# Patient Record
Sex: FEMALE | Race: WHITE | Employment: FULL TIME | URBAN - NONMETROPOLITAN AREA
[De-identification: names, ages, dates, MRNs, and addresses within clinical notes are randomized per-mention and may not be internally consistent; named-entity substitution may affect disease eponyms.]

---

## 2021-09-21 ENCOUNTER — HOSPITAL ENCOUNTER (OUTPATIENT)
Age: 51
Setting detail: SPECIMEN
Discharge: HOME | End: 2021-09-21
Payer: COMMERCIAL

## 2021-09-21 PROCEDURE — 86762 RUBELLA ANTIBODY: CPT

## 2021-09-21 PROCEDURE — 86765 RUBEOLA ANTIBODY: CPT

## 2021-09-21 PROCEDURE — 86735 MUMPS ANTIBODY: CPT

## 2021-09-21 PROCEDURE — 86787 VARICELLA-ZOSTER ANTIBODY: CPT

## 2021-09-21 PROCEDURE — 36415 COLL VENOUS BLD VENIPUNCTURE: CPT

## 2021-09-22 LAB — RUBV IGG SER QL: 31 IU/ML

## 2021-09-24 LAB
MEASLES ANTIBODY IGG: 3.16
MUV IGG SER QL: 2.52
VZV IGG SER QL IA: 4.07

## 2022-06-13 ENCOUNTER — OFFICE VISIT (OUTPATIENT)
Dept: PRIMARY CARE CLINIC | Age: 52
End: 2022-06-13
Payer: COMMERCIAL

## 2022-06-13 VITALS
DIASTOLIC BLOOD PRESSURE: 78 MMHG | SYSTOLIC BLOOD PRESSURE: 126 MMHG | HEIGHT: 66 IN | HEART RATE: 60 BPM | OXYGEN SATURATION: 98 % | BODY MASS INDEX: 38.25 KG/M2 | WEIGHT: 238 LBS

## 2022-06-13 DIAGNOSIS — Z13.1 DIABETES MELLITUS SCREENING: ICD-10-CM

## 2022-06-13 DIAGNOSIS — Z13.220 LIPID SCREENING: ICD-10-CM

## 2022-06-13 DIAGNOSIS — Z12.11 ENCOUNTER FOR SCREENING FOR MALIGNANT NEOPLASM OF COLON: ICD-10-CM

## 2022-06-13 DIAGNOSIS — E03.9 HYPOTHYROIDISM, UNSPECIFIED TYPE: ICD-10-CM

## 2022-06-13 DIAGNOSIS — I10 PRIMARY HYPERTENSION: ICD-10-CM

## 2022-06-13 DIAGNOSIS — Z00.00 ENCOUNTER FOR WELL ADULT EXAM WITHOUT ABNORMAL FINDINGS: Primary | ICD-10-CM

## 2022-06-13 DIAGNOSIS — Z12.31 ENCOUNTER FOR SCREENING MAMMOGRAM FOR MALIGNANT NEOPLASM OF BREAST: ICD-10-CM

## 2022-06-13 PROCEDURE — 99386 PREV VISIT NEW AGE 40-64: CPT | Performed by: STUDENT IN AN ORGANIZED HEALTH CARE EDUCATION/TRAINING PROGRAM

## 2022-06-13 RX ORDER — OLMESARTAN MEDOXOMIL AND HYDROCHLOROTHIAZIDE 40/25 40; 25 MG/1; MG/1
1 TABLET ORAL DAILY
COMMUNITY
End: 2022-06-13 | Stop reason: SDUPTHER

## 2022-06-13 RX ORDER — HYDROCHLOROTHIAZIDE 25 MG/1
25 TABLET ORAL DAILY
COMMUNITY
End: 2022-06-13 | Stop reason: SDUPTHER

## 2022-06-13 RX ORDER — HYDROCHLOROTHIAZIDE 25 MG/1
25 TABLET ORAL DAILY
Qty: 90 TABLET | Refills: 1 | Status: SHIPPED | OUTPATIENT
Start: 2022-06-13 | End: 2022-06-15 | Stop reason: CLARIF

## 2022-06-13 RX ORDER — OLMESARTAN MEDOXOMIL AND HYDROCHLOROTHIAZIDE 40/25 40; 25 MG/1; MG/1
1 TABLET ORAL DAILY
Qty: 90 TABLET | Refills: 1 | Status: SHIPPED | OUTPATIENT
Start: 2022-06-13 | End: 2022-09-09 | Stop reason: SDUPTHER

## 2022-06-13 RX ORDER — LEVOTHYROXINE SODIUM 0.12 MG/1
125 TABLET ORAL DAILY
Qty: 90 TABLET | Refills: 1 | Status: SHIPPED | OUTPATIENT
Start: 2022-06-13 | End: 2022-09-09 | Stop reason: SDUPTHER

## 2022-06-13 RX ORDER — LEVOTHYROXINE SODIUM 0.12 MG/1
125 TABLET ORAL DAILY
COMMUNITY
End: 2022-06-13 | Stop reason: SDUPTHER

## 2022-06-13 ASSESSMENT — PATIENT HEALTH QUESTIONNAIRE - PHQ9
DEPRESSION UNABLE TO ASSESS: PT REFUSES
SUM OF ALL RESPONSES TO PHQ QUESTIONS 1-9: 0
SUM OF ALL RESPONSES TO PHQ9 QUESTIONS 1 & 2: 0
SUM OF ALL RESPONSES TO PHQ QUESTIONS 1-9: 0
SUM OF ALL RESPONSES TO PHQ QUESTIONS 1-9: 0
2. FEELING DOWN, DEPRESSED OR HOPELESS: 0
1. LITTLE INTEREST OR PLEASURE IN DOING THINGS: 0
SUM OF ALL RESPONSES TO PHQ QUESTIONS 1-9: 0

## 2022-06-13 NOTE — PROGRESS NOTES
received counseling on the following healthy behaviors: nutrition, exercise and medication adherence. I encouraged and discussed lifestyle modifications including diet and exercise and the patient was agreeable to making positive/beneficial changes to both to help improve their overall health. 150+ minutes/week of exercise would be beneficial for the patient. Medications Discontinued During This Encounter   Medication Reason    hydroCHLOROthiazide (HYDRODIURIL) 25 MG tablet REORDER    levothyroxine (SYNTHROID) 125 MCG tablet REORDER    olmesartan-hydroCHLOROthiazide (BENICAR HCT) 40-25 MG per tablet REORDER      Return in about 6 months (around 12/13/2022), or if symptoms worsen or fail to improve, for Book PAP anytime when patient is available, F/U Meds/Thyroid in 6 months. Discussed use, benefit, and side effects of prescribed medications. Barriers to medication compliance addressed. Patient given educational materials: see patient instructions. HM - HM items completed today as per orders. Outstanding HM items though not limited to immunizations were discussed with the patient today, including risks, benefits and alternatives. The patient will discuss these during the next appointment per their preference. If there are any worsening or concerning signs or symptoms, patient will report to the ED and/or contact [id number] for immediate evaluation. Teach back method was used. All patient questions answered. Pt voiced understanding. Subjective    SUBJECTIVE/OBJECTIVE:    HPI     Establish Care, Hypertension, and Hypothyroidism  Patient states that she is here for the a Well Adult Exam / Establish care  Patient works in registration at [employer]    Hypertension: Patient here for follow-up of elevated blood pressure. She is not exercising. She is not adherent to a low-salt diet. Blood pressure is not being monitored at homeCardiac symptoms fatigue.  Patient denies chest pain, chest pressure/discomfort, claudication, dyspnea, exertional chest pressure/discomfort, irregular heart beat, lower extremity edema, near-syncope, orthopnea, palpitations, paroxysmal nocturnal dyspnea, syncope and tachypnea. Cardiovascular risk factors: hypertension, obesity (BMI >= 30 kg/m2) and sedentary lifestyle. Use of agents associated with hypertension: none. History of target organ damage: none. The patient is currently on HCTZ/Benicar-HCTZ. Hypothyroidism (diagnosed about 4 years ago). Patient complains of hypothyroidism. Current symptoms: fatigue at times. Patient denies change in energy level, diarrhea, heat / cold intolerance, nervousness, palpitations and weight changes Symptoms are stabilized. She was diagnosed about 4 years ago because she was very tired. Last TSH was on 2021;  \"  Ref Range & Units 21 1207    TSH 0.45 - 5.33 uIU/ML 0.594    Resulting 400 [facility] 635   Specimen Collected: 21 12:07 Last Resulted: 21 14:08   Received From: Tavon OSWALD Morris  Result Received: 22 15:34   \"    Patient states that she has a history of a brain tumor in  which she states was benign. She had trouble walking at that time but denies any acute neurological deficits since then. Last PAP -2017 - was about 5+ years ago - WNL, due for a repeat; HPV was negative, [id number]  Last Mammogram was  - WNL, due for repeat  Last Colonoscopy was about 5 years ago - WNL; patient would want to get a Cologuard instead. She states that she received a colonoscopy as she does not know her family history in detail as she has been adopted.     Family History   Adopted: Yes   Problem Relation Age of Onset    Thyroid Disease Mother     Hypertension Mother     Other Father         shrinking brain syndrome       Health Maintenance   Alcohol/Substance use History - None/Minimal  Smoking History    Tobacco Use Smoking status: Never Smoker      Smokeless tobacco: Never Used      Health Maintenance   Topic Date Due    Cervical cancer screen  Never done    Colorectal Cancer Screen  Never done    Breast cancer screen  Never done    Shingles vaccine (1 of 2) Never done    DTaP/Tdap/Td vaccine (1 - Tdap) 06/13/2023 (Originally 5/7/1990)    Hepatitis C screen  06/13/2023 (Originally 5/7/1989)    HIV screen  06/13/2023 (Originally 5/7/1986)    COVID-19 Vaccine (1) 06/13/2032 (Originally 5/7/1976)    Depression Screen  06/13/2023    A1C test (Diabetic or Prediabetic)  06/14/2023    Lipids  06/14/2027    Flu vaccine  Completed    Hepatitis A vaccine  Aged Out    Hepatitis B vaccine  Aged Out    Hib vaccine  Aged Out    Meningococcal (ACWY) vaccine  Aged Out    Pneumococcal 0-64 years Vaccine  Aged Out      Depression Screening  PHQ Scores 6/13/2022   PHQ2 Score 0   PHQ9 Score 0     Interpretation of Total Score Depression Severity: 1-4 = Minimal depression, 5-9 = Mild depression, 10-14 = Moderate depression, 15-19 = Moderately severe depression, 20-27 = Severe depression      Review of Systems   Constitutional: Negative for activity change, appetite change, chills, diaphoresis,, fever and unexpected weight change. Positive for fatigue  HENT: Negative for sinus pressure, sinus pain, sore throat and trouble swallowing. Respiratory: Negative for cough, shortness of breath and wheezing. Cardiovascular: Negative for chest pain, palpitations and leg swelling. Gastrointestinal: Negative for abdominal pain, diarrhea, nausea and vomiting. Endocrine: Negative for cold intolerance, polydipsia, polyphagia and polyuria. Genitourinary: Negative for difficulty urinating, flank pain and frequency. Musculoskeletal: Negative for gait problem and joint swelling. Negative for back pain, neck pain and neck stiffness. Skin: Negative for color change and wound. Negative for pallor and rash.    Allergic/Immunologic: Negative for environmental allergies and food allergies. Neurological: Negative for light-headedness, numbness and headaches. Psychiatric/Behavioral: Negative for sleep disturbance. Negative for confusion and suicidal ideas. Objective    Physical Exam   Constitutional: Patient is oriented to person, place, and time. Patient appears well-developed and well-nourished. No distress. HENT: Head: Normocephalic and atraumatic. Eyes: Pupils are equal, round, and reactive to light. Conjunctivae are normal. Right eye exhibits no discharge. Left eye exhibits no discharge. Cardiovascular: Normal rate, regular rhythm and normal heart sounds. Pulmonary/Chest: Effort normal and breath sounds normal. No respiratory distress. Patient has no wheezes. Abdominal: Soft. Bowel sounds are normal. Patient exhibits no distension. There is no tenderness. Musculoskeletal:  Patient exhibits no edema and tenderness. Patient exhibits no deformity. Neurological: Patient is alert and oriented to person, place, and time. Neurological Exam: alert, oriented, normal speech, no focal findings or movement disorder noted, screening mental status exam normal, neck supple without rigidity, cranial nerves II through XII intact, funduscopic exam normal, discs flat and sharp, DTR's normal and symmetric, Babinski sign negative, motor and sensory grossly normal bilaterally, normal muscle tone, no tremors, strength 5/5, Romberg sign negative, normal gait and station. Skin: Skin is warm and dry. Patient is not diaphoretic. Posterior cervical neck appears to have well-healed surgical scar present. Nontender to palpation. Surgical scar appears clean dry and intact  Psychiatric: Patient's speech is normal and behavior is normal. Thought content normal.   Vitals reviewed.     /78   Pulse 60   Ht 5' 6\" (1.676 m)   Wt 238 lb (108 kg)   SpO2 98%   BMI 38.41 kg/m²   BP Readings from Last 3 Encounters:   06/13/22 126/78     Lab Results Component Value Date    WBC 5.8 06/14/2022    HGB 13.1 06/14/2022    HCT 40.3 06/14/2022     06/14/2022    CHOL 152 06/14/2022    TRIG 216 (H) 06/14/2022    HDL 34 (L) 06/14/2022    ALT 44 (H) 06/14/2022    AST 36 (H) 06/14/2022     06/14/2022    K 3.6 (L) 06/14/2022     06/14/2022    CREATININE 0.85 06/14/2022    BUN 21 (H) 06/14/2022    CO2 28 06/14/2022    TSH 0.91 06/14/2022    LABA1C 6.2 (H) 06/14/2022     Lab Results   Component Value Date    CALCIUM 9.8 06/14/2022     Lab Results   Component Value Date    LDLCHOLESTEROL 75 06/14/2022       CURRENT MEDS W/ ASSOC DIAG     No medications on file. Please note that this chart was generated using voice recognition Dragon dictation software. Although every effort was made to ensure the accuracy of this automated transcription, some errors in transcription may have occurred.     Electronically signed by Jaz Pena MD on 6/15/2022 at 8:38 AM

## 2022-06-14 ENCOUNTER — HOSPITAL ENCOUNTER (OUTPATIENT)
Age: 52
Discharge: HOME | End: 2022-06-14
Payer: COMMERCIAL

## 2022-06-14 DIAGNOSIS — Z13.220 LIPID SCREENING: ICD-10-CM

## 2022-06-14 DIAGNOSIS — Z13.1 DIABETES MELLITUS SCREENING: ICD-10-CM

## 2022-06-14 DIAGNOSIS — E03.9 HYPOTHYROIDISM, UNSPECIFIED TYPE: ICD-10-CM

## 2022-06-14 DIAGNOSIS — I10 PRIMARY HYPERTENSION: ICD-10-CM

## 2022-06-14 LAB
ABSOLUTE EOS #: 0.26 K/UL (ref 0–0.44)
ABSOLUTE IMMATURE GRANULOCYTE: <0.03 K/UL (ref 0–0.3)
ABSOLUTE LYMPH #: 1.93 K/UL (ref 1.1–3.7)
ABSOLUTE MONO #: 0.56 K/UL (ref 0.1–1.2)
ALBUMIN SERPL-MCNC: 4.4 G/DL (ref 3.5–5.2)
ALBUMIN/GLOBULIN RATIO: 1.5 (ref 1–2.5)
ALP BLD-CCNC: 83 U/L (ref 35–104)
ALT SERPL-CCNC: 44 U/L (ref 5–33)
ANION GAP SERPL CALCULATED.3IONS-SCNC: 9 MMOL/L (ref 9–17)
AST SERPL-CCNC: 36 U/L
BASOPHILS # BLD: 1 % (ref 0–2)
BASOPHILS ABSOLUTE: 0.05 K/UL (ref 0–0.2)
BILIRUB SERPL-MCNC: 0.31 MG/DL (ref 0.3–1.2)
BILIRUBIN DIRECT: <0.08 MG/DL
BILIRUBIN, INDIRECT: ABNORMAL MG/DL (ref 0–1)
BUN BLDV-MCNC: 21 MG/DL (ref 6–20)
CALCIUM SERPL-MCNC: 9.8 MG/DL (ref 8.6–10.4)
CHLORIDE BLD-SCNC: 100 MMOL/L (ref 98–107)
CHOLESTEROL/HDL RATIO: 4.5
CHOLESTEROL: 152 MG/DL
CO2: 28 MMOL/L (ref 20–31)
CREAT SERPL-MCNC: 0.85 MG/DL (ref 0.5–0.9)
EOSINOPHILS RELATIVE PERCENT: 5 % (ref 1–4)
ESTIMATED AVERAGE GLUCOSE: 131 MG/DL
GFR AFRICAN AMERICAN: >60 ML/MIN
GFR NON-AFRICAN AMERICAN: >60 ML/MIN
GFR SERPL CREATININE-BSD FRML MDRD: ABNORMAL ML/MIN/{1.73_M2}
GFR SERPL CREATININE-BSD FRML MDRD: ABNORMAL ML/MIN/{1.73_M2}
GLUCOSE BLD-MCNC: 106 MG/DL (ref 70–99)
HBA1C MFR BLD: 6.2 % (ref 4–6)
HCT VFR BLD CALC: 40.3 % (ref 36.3–47.1)
HDLC SERPL-MCNC: 34 MG/DL
HEMOGLOBIN: 13.1 G/DL (ref 11.9–15.1)
IMMATURE GRANULOCYTES: 0 %
LDL CHOLESTEROL: 75 MG/DL (ref 0–130)
LYMPHOCYTES # BLD: 34 % (ref 24–43)
MCH RBC QN AUTO: 31.5 PG (ref 25.2–33.5)
MCHC RBC AUTO-ENTMCNC: 32.5 G/DL (ref 28.4–34.8)
MCV RBC AUTO: 96.9 FL (ref 82.6–102.9)
MONOCYTES # BLD: 10 % (ref 3–12)
NRBC AUTOMATED: 0 PER 100 WBC
PDW BLD-RTO: 12.3 % (ref 11.8–14.4)
PLATELET # BLD: 215 K/UL (ref 138–453)
PMV BLD AUTO: 9.7 FL (ref 8.1–13.5)
POTASSIUM SERPL-SCNC: 3.6 MMOL/L (ref 3.7–5.3)
RBC # BLD: 4.16 M/UL (ref 3.95–5.11)
SEG NEUTROPHILS: 50 % (ref 36–65)
SEGMENTED NEUTROPHILS ABSOLUTE COUNT: 2.95 K/UL (ref 1.5–8.1)
SODIUM BLD-SCNC: 137 MMOL/L (ref 135–144)
TOTAL PROTEIN: 7.4 G/DL (ref 6.4–8.3)
TRIGL SERPL-MCNC: 216 MG/DL
TSH SERPL DL<=0.05 MIU/L-ACNC: 0.91 UIU/ML (ref 0.3–5)
WBC # BLD: 5.8 K/UL (ref 3.5–11.3)

## 2022-06-14 PROCEDURE — 84443 ASSAY THYROID STIM HORMONE: CPT

## 2022-06-14 PROCEDURE — 36415 COLL VENOUS BLD VENIPUNCTURE: CPT

## 2022-06-14 PROCEDURE — 85025 COMPLETE CBC W/AUTO DIFF WBC: CPT

## 2022-06-14 PROCEDURE — 80061 LIPID PANEL: CPT

## 2022-06-14 PROCEDURE — 80053 COMPREHEN METABOLIC PANEL: CPT

## 2022-06-14 PROCEDURE — 82248 BILIRUBIN DIRECT: CPT

## 2022-06-14 PROCEDURE — 83036 HEMOGLOBIN GLYCOSYLATED A1C: CPT

## 2022-07-13 PROBLEM — Z00.00 ENCOUNTER FOR WELL ADULT EXAM WITHOUT ABNORMAL FINDINGS: Status: RESOLVED | Noted: 2022-06-13 | Resolved: 2022-07-13

## 2022-07-13 PROBLEM — Z12.31 ENCOUNTER FOR SCREENING MAMMOGRAM FOR MALIGNANT NEOPLASM OF BREAST: Status: RESOLVED | Noted: 2022-06-13 | Resolved: 2022-07-13

## 2022-07-13 PROBLEM — Z13.220 LIPID SCREENING: Status: RESOLVED | Noted: 2022-06-13 | Resolved: 2022-07-13

## 2022-07-13 PROBLEM — Z12.11 ENCOUNTER FOR SCREENING FOR MALIGNANT NEOPLASM OF COLON: Status: RESOLVED | Noted: 2022-06-13 | Resolved: 2022-07-13

## 2022-07-13 PROBLEM — Z13.1 DIABETES MELLITUS SCREENING: Status: RESOLVED | Noted: 2022-06-13 | Resolved: 2022-07-13

## 2022-07-25 ENCOUNTER — OFFICE VISIT (OUTPATIENT)
Dept: PRIMARY CARE CLINIC | Age: 52
End: 2022-07-25
Payer: COMMERCIAL

## 2022-07-25 ENCOUNTER — HOSPITAL ENCOUNTER (OUTPATIENT)
Age: 52
Setting detail: SPECIMEN
Discharge: HOME | End: 2022-07-25
Payer: COMMERCIAL

## 2022-07-25 VITALS
RESPIRATION RATE: 18 BRPM | BODY MASS INDEX: 37.64 KG/M2 | SYSTOLIC BLOOD PRESSURE: 129 MMHG | WEIGHT: 234.2 LBS | HEART RATE: 67 BPM | HEIGHT: 66 IN | DIASTOLIC BLOOD PRESSURE: 85 MMHG

## 2022-07-25 DIAGNOSIS — J22 ACUTE RESPIRATORY INFECTION: ICD-10-CM

## 2022-07-25 DIAGNOSIS — J22 ACUTE RESPIRATORY INFECTION: Primary | ICD-10-CM

## 2022-07-25 LAB
SARS-COV-2, RAPID: DETECTED
SPECIMEN DESCRIPTION: ABNORMAL

## 2022-07-25 PROCEDURE — 87635 SARS-COV-2 COVID-19 AMP PRB: CPT

## 2022-07-25 PROCEDURE — 99213 OFFICE O/P EST LOW 20 MIN: CPT | Performed by: FAMILY MEDICINE

## 2022-07-25 PROCEDURE — C9803 HOPD COVID-19 SPEC COLLECT: HCPCS

## 2022-07-25 RX ORDER — AZITHROMYCIN 250 MG/1
250 TABLET, FILM COATED ORAL SEE ADMIN INSTRUCTIONS
Qty: 6 TABLET | Refills: 0 | Status: SHIPPED | OUTPATIENT
Start: 2022-07-25 | End: 2022-07-30

## 2022-07-25 SDOH — ECONOMIC STABILITY: FOOD INSECURITY: WITHIN THE PAST 12 MONTHS, THE FOOD YOU BOUGHT JUST DIDN'T LAST AND YOU DIDN'T HAVE MONEY TO GET MORE.: NEVER TRUE

## 2022-07-25 SDOH — ECONOMIC STABILITY: TRANSPORTATION INSECURITY
IN THE PAST 12 MONTHS, HAS THE LACK OF TRANSPORTATION KEPT YOU FROM MEDICAL APPOINTMENTS OR FROM GETTING MEDICATIONS?: NO

## 2022-07-25 SDOH — ECONOMIC STABILITY: FOOD INSECURITY: WITHIN THE PAST 12 MONTHS, YOU WORRIED THAT YOUR FOOD WOULD RUN OUT BEFORE YOU GOT MONEY TO BUY MORE.: NEVER TRUE

## 2022-07-25 SDOH — ECONOMIC STABILITY: TRANSPORTATION INSECURITY
IN THE PAST 12 MONTHS, HAS LACK OF TRANSPORTATION KEPT YOU FROM MEETINGS, WORK, OR FROM GETTING THINGS NEEDED FOR DAILY LIVING?: NO

## 2022-07-25 ASSESSMENT — SOCIAL DETERMINANTS OF HEALTH (SDOH): HOW HARD IS IT FOR YOU TO PAY FOR THE VERY BASICS LIKE FOOD, HOUSING, MEDICAL CARE, AND HEATING?: NOT HARD AT ALL

## 2022-07-25 NOTE — PROGRESS NOTES
Cough   for 5 days  Nature of Onset and Mechanism -  worsening  Characteristics/Radiation/Quality - cough, congestion, sore throat, fatigue, body aches, sweats and chills  Aggravating Factors - nothing in particular  Relieving Factors/Treatment tried - peppermint oil, vicks, tylenol. Allergies:  Penicillins    Past Medical History:    No past medical history on file. Past Surgical History:    Past Surgical History:   Procedure Laterality Date    BRAIN TUMOR EXCISION  04/2009    C1 and C2 removed    ENDOMETRIAL ABLATION  12/16/2016    TUBAL LIGATION  12/16/2016       Social History:   Social History     Tobacco Use    Smoking status: Never    Smokeless tobacco: Never   Substance Use Topics    Alcohol use: Yes       Family History:   Family History   Adopted: Yes   Problem Relation Age of Onset    Thyroid Disease Mother     Hypertension Mother     Other Father         shrinking brain syndrome         Review of Systems:  Constitutional: neg for fever , chills, neg for headache  Eyes: negative for visual disturbance   ENT: positive  for sore throat , positive for nasal congestion, neg for ear pain  Respiratory: positive for cough, neg for shortness of breath neg for sputum   Cardiovascular: negative for chest pain,pnd or palpitations  Gastrointestinal: negative for abd pain, nausea, vomiting, diarrhea or constipation  Integument/breast: negative for skin rash or lesions  Neurological: negative for unilateral weakness, numbness or tingling. No joint pain,bodyache       Objective:  Physical Exam:  GEN:   A & O x3, no apparent distress  EYES: No gross abnormalities.   ENT:right and left TM normal without fluid or infection, neck without nodes, pharynx erythematous without exudate, and nasal mucosa congested  NECK: normal, supple, no lymphadenopathy,    PULM: clear to auscultation bilaterally- no wheezes, rales or rhonchi, normal air movement, no respiratory distress  COR: regular rate & rhythm and no gallops  EXT: normal strength, tone, and muscle mass, Extremities: + 2 pedal pulses, no edema or calf tenderness, and warm to touch. Normal nails without lesions  SKIN:  No skin lesions or rashes        Assessment:  1. Acute respiratory infection          Plan:  1. Acute respiratory infection      Encouraged increased oral fluids  May use OTC meds:    Tylenol po q6 hrs PRN fever   Robitussin prn   Orders Placed This Encounter   Procedures    COVID-19     RAPID     Standing Status:   Future     Standing Expiration Date:   7/25/2023     Scheduling Instructions:      1) Due to current limited availability of the COVID-19 test, tests will be prioritized based on responses to questions above. Testing may be delayed due to volume. 2) Print and instruct patient to adhere to CDC home isolation program. (Link Above)              3) Set up or refer patient for a monitoring program.              4) Have patient sign up for and leverage MyChart (if not previously done). Order Specific Question:   Is this test for diagnosis or screening? Answer:   Diagnosis of ill patient     Order Specific Question:   Symptomatic for COVID-19 as defined by CDC? Answer:   Yes     Order Specific Question:   Date of Symptom Onset     Answer:   7/21/2022     Order Specific Question:   Hospitalized for COVID-19? Answer:   No     Order Specific Question:   Admitted to ICU for COVID-19? Answer:   No     Order Specific Question:   Employed in healthcare setting? Answer:   Yes     Order Specific Question:   Resident in a congregate (group) care setting? Answer:   No     Order Specific Question:   Pregnant? Answer:   No       No follow-ups on file.    Orders Placed This Encounter   Medications    azithromycin (ZITHROMAX) 250 MG tablet     Sig: Take 1 tablet by mouth See Admin Instructions for 5 days 500mg on day 1 followed by 250mg on days 2 - 5     Dispense:  6 tablet     Refill:  0         Electronically signed by Jean Carlos De Souza MD on 7/25/2022 at 4:28 PM

## 2022-07-26 ENCOUNTER — TELEPHONE (OUTPATIENT)
Dept: PRIMARY CARE CLINIC | Age: 52
End: 2022-07-26

## 2022-09-08 ENCOUNTER — HOSPITAL ENCOUNTER (OUTPATIENT)
Age: 52
Setting detail: SPECIMEN
Discharge: HOME | End: 2022-09-08
Payer: COMMERCIAL

## 2022-09-08 ENCOUNTER — OFFICE VISIT (OUTPATIENT)
Dept: PRIMARY CARE CLINIC | Age: 52
End: 2022-09-08
Payer: COMMERCIAL

## 2022-09-08 VITALS
WEIGHT: 236 LBS | SYSTOLIC BLOOD PRESSURE: 147 MMHG | HEART RATE: 63 BPM | RESPIRATION RATE: 18 BRPM | HEIGHT: 66 IN | BODY MASS INDEX: 37.93 KG/M2 | DIASTOLIC BLOOD PRESSURE: 94 MMHG

## 2022-09-08 DIAGNOSIS — R73.03 PREDIABETES: Primary | ICD-10-CM

## 2022-09-08 DIAGNOSIS — Z12.4 PAP SMEAR FOR CERVICAL CANCER SCREENING: ICD-10-CM

## 2022-09-08 PROCEDURE — G0145 SCR C/V CYTO,THINLAYER,RESCR: HCPCS

## 2022-09-08 PROCEDURE — 87624 HPV HI-RISK TYP POOLED RSLT: CPT

## 2022-09-08 PROCEDURE — 99213 OFFICE O/P EST LOW 20 MIN: CPT | Performed by: STUDENT IN AN ORGANIZED HEALTH CARE EDUCATION/TRAINING PROGRAM

## 2022-09-08 NOTE — PROGRESS NOTES
[address], [address] , [facility], [place] [address] is a 47 y.o. female with  has no past medical history on file. Presented to the office today for:  Chief Complaint   Patient presents with    Gynecologic Exam       Assessment/Plan   1. Prediabetes  2. Pap smear for cervical cancer screening  -     PAP SMEAR    Encouraged and discussed lifestyle modifications, including diet/exercise with the patient today, given last A1c was 6.2. Continue to monitor off any medications at this time and plant to recheck levels. Discussed that she would be called w/ Pap Results, plan to repeat in about 5 year's time if WNL (HPV completed today). Breast Ca Screening had been ordered prior. Return for F/U tomorrow as per prior plans. All patient questions answered. Pt voiced understanding. There are no discontinued medications. Patient received counseling on the following healthy behaviors: nutrition, exercise and medication adherence. I encouraged and discussed lifestyle modifications including diet and exercise and the patient was agreeable to making positive/beneficial changes to both to help improve their overall health. Discussed use, benefit, and side effects of prescribed medications. Barriers to medication compliance addressed. Patient given educational materials: see patient instructions. HM - HM items completed today as per orders. Outstanding HM items though not limited to immunizations were discussed with the patient today, including risks, benefits and alternatives. The patient will discuss these during the next appointment per their preference. If there are any worsening or concerning signs or symptoms, patient will report to the ED and/or contact [id number] for immediate evaluation. Teach back method was used. Subjective:    HPI  Chief Complaint   Patient presents with    Gynecologic Exam     The patient's lab results were reviewed.  She has prediabetes based on the latest A1c. The patient is aware of the results and is making some changes at this time. The patient was seen and examined. She denies irregular/heavy bleeding and dysmenorrhea. Her periods finished about a few years ago, s/p ablation. Her bowel habits are regular. She denies any bloating. She denies dysuria currently. She denies urinary leaking. She denies vaginal discharge. She is sexually active with one partner. She is not desiring pregnancy. Family History   Adopted: Yes   Problem Relation Age of Onset    Thyroid Disease Mother     Hypertension Mother     Other Father         shrinking brain syndrome       PHQ Scores 6/13/2022   PHQ2 Score 0   PHQ9 Score 0     Interpretation of Total Score Depression Severity: 1-4 = Minimal depression, 5-9 = Mild depression, 10-14 = Moderate depression, 15-19 = Moderately severe depression, 20-27 = Severe depression    Review of Systems  Constitutional: Negative for activity change, appetite change, chills, diaphoresis, fatigue, fever and unexpected weight change. HENT: Negative for sinus pressure, sinus pain, sore throat and trouble swallowing. Respiratory: Negative for cough, shortness of breath and wheezing. Cardiovascular: Negative for chest pain, palpitations and leg swelling. Gastrointestinal: Negative for abdominal pain, diarrhea, nausea and vomiting. Endocrine: Negative for cold intolerance, polydipsia, polyphagia and polyuria. Genitourinary: Negative for difficulty urinating, flank pain and frequency. Musculoskeletal: Negative for gait problem and joint swelling. Negative for back pain, neck pain and neck stiffness. Skin: Negative for color change and wound. Negative for pallor and rash. Allergic/Immunologic: Negative for environmental allergies and food allergies. Neurological: Negative for light-headedness, numbness and headaches. Psychiatric/Behavioral: Negative for sleep disturbance.  Negative for confusion and suicidal ideas. Objective:    BP (!) 147/94 (Site: Left Upper Arm, Position: Sitting, Cuff Size: Medium Adult)   Pulse 63   Resp 18   Ht 5' 6\" (1.676 m)   Wt 236 lb (107 kg)   BMI 38.09 kg/m²    BP Readings from Last 3 Encounters:   09/08/22 (!) 147/94   07/25/22 129/85   06/13/22 126/78     Physical Exam  Chaperon present throughout the entire encounter, Judd Nicole MA    Constitutional: Patient is oriented to person, place, and time. Patient appears well-developed and well-nourished. No distress. HENT: Head: Normocephalic and atraumatic. Eyes: Pupils are equal, round, and reactive to light. Conjunctivae are normal. Right eye exhibits no discharge. Left eye exhibits no discharge. Cardiovascular: Normal rate, regular rhythm and normal heart sounds. Pulmonary/Chest: Effort normal and breath sounds normal. No respiratory distress. Patient has no wheezes. Abdominal: Soft. Bowel sounds are normal. Patient exhibits no distension. There is no tenderness. Breast:  (Chest): normal appearance, no masses or tenderness, No nipple retraction or dimpling, No nipple discharge or bleeding, No axillary or supraclavicular adenopathy, Normal to palpation without dominant masses. Breast tissue appears to be dense. Abdomen: soft, non-tender, non-distended, no right upper quadrant tenderness, and no CVA tenderness  Pelvic Exam:   External genitalia: General appearance; normal, Hair distribution; normal, Lesions absent  Urinary system: urethral meatus normal  Vaginal: normal mucosa, minimal white discharge noted  Cervix: normal appearing cervix without discharge or lesions  Adnexa: normal adnexa in size, nontender and no masses  Musculoskeletal:  Patient exhibits no edema and tenderness. Patient exhibits no deformity. Neurological: Patient is alert and oriented to person, place, and time. Skin: Skin is warm and dry. Patient is not diaphoretic.    Psychiatric: Patient's speech is normal and

## 2022-09-09 ENCOUNTER — OFFICE VISIT (OUTPATIENT)
Dept: PRIMARY CARE CLINIC | Age: 52
End: 2022-09-09
Payer: COMMERCIAL

## 2022-09-09 VITALS
SYSTOLIC BLOOD PRESSURE: 136 MMHG | OXYGEN SATURATION: 97 % | BODY MASS INDEX: 38.09 KG/M2 | HEIGHT: 66 IN | DIASTOLIC BLOOD PRESSURE: 78 MMHG | HEART RATE: 75 BPM | WEIGHT: 237 LBS

## 2022-09-09 DIAGNOSIS — I10 PRIMARY HYPERTENSION: Primary | ICD-10-CM

## 2022-09-09 DIAGNOSIS — M79.672 LEFT FOOT PAIN: ICD-10-CM

## 2022-09-09 DIAGNOSIS — E03.9 HYPOTHYROIDISM, UNSPECIFIED TYPE: ICD-10-CM

## 2022-09-09 DIAGNOSIS — R60.0 LEG EDEMA: ICD-10-CM

## 2022-09-09 PROCEDURE — 99214 OFFICE O/P EST MOD 30 MIN: CPT | Performed by: STUDENT IN AN ORGANIZED HEALTH CARE EDUCATION/TRAINING PROGRAM

## 2022-09-09 RX ORDER — LEVOTHYROXINE SODIUM 0.12 MG/1
125 TABLET ORAL DAILY
Qty: 90 TABLET | Refills: 3 | Status: SHIPPED | OUTPATIENT
Start: 2022-09-09

## 2022-09-09 RX ORDER — MELOXICAM 15 MG/1
15 TABLET ORAL DAILY PRN
Qty: 90 TABLET | Refills: 0 | Status: SHIPPED | OUTPATIENT
Start: 2022-09-09

## 2022-09-09 RX ORDER — FUROSEMIDE 20 MG/1
20 TABLET ORAL DAILY PRN
Qty: 90 TABLET | Refills: 0 | Status: SHIPPED | OUTPATIENT
Start: 2022-09-09 | End: 2022-09-09

## 2022-09-09 RX ORDER — LEVOTHYROXINE SODIUM 0.12 MG/1
125 TABLET ORAL DAILY
Qty: 90 TABLET | Refills: 3 | Status: SHIPPED | OUTPATIENT
Start: 2022-09-09 | End: 2022-09-09

## 2022-09-09 RX ORDER — MELOXICAM 15 MG/1
15 TABLET ORAL DAILY PRN
Qty: 90 TABLET | Refills: 0 | Status: SHIPPED | OUTPATIENT
Start: 2022-09-09 | End: 2022-09-09

## 2022-09-09 RX ORDER — FUROSEMIDE 20 MG/1
20 TABLET ORAL DAILY PRN
Qty: 90 TABLET | Refills: 0 | Status: SHIPPED | OUTPATIENT
Start: 2022-09-09

## 2022-09-09 RX ORDER — OLMESARTAN MEDOXOMIL AND HYDROCHLOROTHIAZIDE 40/25 40; 25 MG/1; MG/1
1 TABLET ORAL DAILY
Qty: 90 TABLET | Refills: 3 | Status: SHIPPED | OUTPATIENT
Start: 2022-09-09 | End: 2022-09-09

## 2022-09-09 RX ORDER — OLMESARTAN MEDOXOMIL AND HYDROCHLOROTHIAZIDE 40/25 40; 25 MG/1; MG/1
1 TABLET ORAL DAILY
Qty: 90 TABLET | Refills: 3 | Status: SHIPPED | OUTPATIENT
Start: 2022-09-09

## 2022-09-09 NOTE — PROGRESS NOTES
[address], [address] , [facility], [address]  1970 is a 47 y.o. female, Established patient, here for evaluation of the following chief complaint(s):    3 Month Follow-Up, Hypertension, and Hypothyroidism       ASSESSMENT/PLAN:      1. Primary hypertension  -     olmesartan-hydroCHLOROthiazide (BENICAR HCT) 40-25 MG per tablet; Take 1 tablet by mouth daily, Disp-90 tablet, R-3Print  2. Hypothyroidism, unspecified type  -     levothyroxine (SYNTHROID) 125 MCG tablet; Take 1 tablet by mouth Daily, Disp-90 tablet, R-3Print  3. Leg edema  -     XR FOOT LEFT (MIN 3 VIEWS); [facility] Physical [facility] Order for (Specify) as OP  -     furosemide (LASIX) 20 MG tablet; Take 1 tablet by mouth daily as needed (Leg edema), Disp-90 tablet, R-0Print  4. Left foot pain  -     XR FOOT LEFT (MIN 3 VIEWS); [facility]  -     [facility] [place] [facility] Order for (Specify) as OP  -     meloxicam (MOBIC) 15 MG tablet; Take 1 tablet by mouth daily as needed for Pain, Disp-90 tablet, R-0Print     We discussed some of the etiologies and natural histories. We discussed the various treatment alternatives including anti-inflammatory medications, physical therapy, injections, further imaging studies and as a last resort surgery. Hypothyoridism- stable, continue w/ synthroid at the current dose 125mcg  HTN - well controlled, continue w/ Benicar  Leg edema - reduce salt intake (1.5-2g/day); Trial of Lasix 20mg PO daily PRN for leg edema, and use of compression stockings.     Medications Discontinued During This Encounter   Medication Reason    olmesartan-hydroCHLOROthiazide (BENICAR HCT) 40-25 MG per tablet REORDER    levothyroxine (SYNTHROID) 125 MCG tablet REORDER    olmesartan-hydroCHLOROthiazide (BENICAR HCT) 40-25 MG per tablet     levothyroxine (SYNTHROID) 125 MCG tablet     meloxicam (MOBIC) 15 MG tablet     furosemide (LASIX) 20 MG tablet         Return in about 1 month (around 10/9/2022) for F/U Leg edema. Arina Spangler received counseling on the following healthy behaviors: nutrition, exercise and medication adherence. I encouraged and discussed lifestyle modifications including diet and exercise and the patient was agreeable to making positive/beneficial changes to both to help improve their overall health. Discussed use, benefit, and side effects of prescribed medications. Barriers to medication compliance addressed. Patient given educational materials: see patient instructions. HM - HM items completed today as per orders. Outstanding HM items though not limited to immunizations were discussed with the patient today, including risks, benefits and alternatives. The patient will discuss these during the next appointment per their preference. If there are any worsening or concerning signs or symptoms, patient will report to the ED and/or contact [phone] for immediate evaluation. Teach back method was used. All patient questions answered. Pt voiced understanding. Subjective    SUBJECTIVE/OBJECTIVE:    HPI     3 Month Follow-Up, Hypertension, and Hypothyroidism  Achilles tendon injury for 1 months  Nature of Onset and Mechanism -  unchanged, worsening  Location - left achilles tendon  Characteristics/Radiation/Quality - sharp pains   follow up of a pre-existing problem  left foot/ankle pain. The patient states the pain has been present for about 1-2  months. As far as trauma to the area, the patient indicates none. There is  pain with weight bearing. The patient states numbness and tingling is not present. Catching and locking has not been present. She has tried rest, ice, heat without relief. She had tried custom orthotics in the past. The patient had prior vein ablation. The patient had XRAYs years ago.    Severity (0-10) - 8  Aggravating Factors - walking and climbing stairs  Relieving Factors/Treatment tried - rest, relaxation    Hypothyroidism   Patient presents for hypothyroidism follow up. Current symptoms: fatigue at times. Patient denies change in energy level, diarrhea, heat / cold intolerance, nervousness, palpitations and weight changes Symptoms are stabilized. She was diagnosed about 4 years ago because she was very tired. No results found for: [facility]  Lab Results   Component Value Date    TSH 0.91 06/14/2022     Hypertension: Patient here for follow-up of elevated blood pressure. She is not exercising. She is not adherent to a low-salt diet. Blood pressure is not being monitored at home   Blood pressure is well controlled at home. Cardiac symptoms none. Patient denies chest pain, chest pressure/discomfort, claudication, dyspnea, exertional chest pressure/discomfort, fatigue, irregular heart beat, lower extremity edema, near-syncope, orthopnea, palpitations, paroxysmal nocturnal dyspnea, syncope, and tachypnea. The patient is on Benicar and is requesting refills of this at this time. The patient does have intermittent leg edema. She was using HCTZ in the past on an intermittent livia.     Family History   Adopted: Yes   Problem Relation Age of Onset    Thyroid Disease Mother     Hypertension Mother     Other Father         shrinking brain syndrome     Health Maintenance   Alcohol/Substance use History - None  Smoking History    Tobacco Use      Smoking status: Never      Smokeless tobacco: Never      Health Maintenance   Topic Date Due    Cervical cancer screen  Never done    Colorectal Cancer Screen  Never done    Breast cancer screen  Never done    Flu vaccine (1) 09/01/2022    DTaP/Tdap/Td vaccine (1 - Tdap) 06/13/2023 (Originally 5/7/1990)    Hepatitis C screen  06/13/2023 (Originally 5/7/1989)    HIV screen  06/13/2023 (Originally 5/7/1986)    Shingles vaccine (1 of 2) 09/09/2023 (Originally 5/7/2021)    COVID-19 Vaccine (1) 06/13/2032 (Originally 1970)    Depression Screen  06/13/2023 A1C test (Diabetic or Prediabetic)  06/14/2023    Lipids  06/14/2027    Hepatitis A vaccine  Aged Out    Hepatitis B vaccine  Aged Out    Hib vaccine  Aged Out    Meningococcal (ACWY) vaccine  Aged Out    Pneumococcal 0-64 years Vaccine  Aged Out      Depression Screening  PHQ Scores 6/13/2022   PHQ2 Score 0   PHQ9 Score 0     Interpretation of Total Score Depression Severity: 1-4 = Minimal depression, 5-9 = Mild depression, 10-14 = Moderate depression, 15-19 = Moderately severe depression, 20-27 = Severe depression      Review of Systems   Constitutional: Negative for activity change, appetite change, chills, diaphoresis, fatigue, fever and unexpected weight change. HENT: Negative for sinus pressure, sinus pain, sore throat and trouble swallowing. Respiratory: Negative for cough, shortness of breath and wheezing. Cardiovascular: Negative for chest pain, palpitations. Gastrointestinal: Negative for abdominal pain, diarrhea, nausea and vomiting. Endocrine: Negative for cold intolerance, polydipsia, polyphagia and polyuria. Genitourinary: Negative for difficulty urinating, flank pain and frequency. Musculoskeletal: Negative for gait problem and joint swelling. Negative for back pain, neck pain and neck stiffness. Foot/achilles pain and leg edema. Skin: Negative for color change and wound. Negative for pallor and rash. Allergic/Immunologic: Negative for environmental allergies and food allergies. Neurological: Negative for light-headedness, numbness and headaches. Psychiatric/Behavioral: Negative for sleep disturbance. Negative for confusion and suicidal ideas. Objective    Physical Exam   Constitutional: Patient is oriented to person, place, and time. Patient appears well-developed and well-nourished. No distress. HENT: Head: Normocephalic and atraumatic. Eyes: Pupils are equal, round, and reactive to light. Conjunctivae are normal. Right eye exhibits no discharge.  Left eye exhibits no discharge. Cardiovascular: Normal rate, regular rhythm and normal heart sounds. Pulmonary/Chest: Effort normal and breath sounds normal. No respiratory distress. Patient has no wheezes. Abdominal: Soft. Bowel sounds are normal. Patient exhibits no distension. There is no tenderness. Musculoskeletal:  Patient exhibits no edema and tenderness. Patient exhibits no deformity. SKIN:  Intact without rashes, lesions or ulcerations. No obvious deformity or swelling. NEURO: Musculoskeletal and axillary nerves intact to sensory and motor testing. EYES:  Extraocular muscles intact. MOUTH: Oral mucosa moist.  No perioral lesions. PULM:  Respirations unlabored and regular. VASC:  Capillary refill less than 3 seconds. Distal pulses are palpable. There is no lymphadenopathy. Ankle Exam:    Reveals there is  effusion. Swelling is  present. Edema is  present. Ecchymoses is  present. Palpation-Tenderness some tenderness along the achilles tendon  The foot is in WNL alignment. ROM:  40 degrees plantarflexion and 20 degrees dorsiflexion. Subtalar motion is 30 degrees inversion and 20 degrees eversion. Strength-WNL  Sensation-normal to light touch  Special Tests-Ankle inversion: laxity negative  Ankle eversion: laxity negative  Ankle drawer: laxity negative  External rotation:negative  Syndesmotic Squeeze test: negative  The patient is  able to single toe raise. Single leg hop test: negative  Gait: Normal  PSYCH:  Patient has good fund of knowledge and displays understanding of exam.     Neurological: Patient is alert and oriented to person, place, and time. Skin: Skin is warm and dry. Patient is not diaphoretic. Psychiatric: Patient's speech is normal and behavior is normal. Thought content normal.   Vitals reviewed.     /78   Pulse 75   Ht 5' 6\" (1.676 m)   Wt 237 lb (107.5 kg)   SpO2 97%   BMI 38.25 kg/m²   BP Readings from Last 3 Encounters:   09/09/22 136/78   09/08/22 (!) 147/94 07/25/22 129/85     Lab Results   Component Value Date    WBC 5.8 06/14/2022    HGB 13.1 06/14/2022    HCT 40.3 06/14/2022     06/14/2022    CHOL 152 06/14/2022    TRIG 216 (H) 06/14/2022    HDL 34 (L) 06/14/2022    ALT 44 (H) 06/14/2022    AST 36 (H) 06/14/2022     06/14/2022    K 3.6 (L) 06/14/2022     06/14/2022    CREATININE 0.85 06/14/2022    BUN 21 (H) 06/14/2022    CO2 28 06/14/2022    TSH 0.91 06/14/2022    LABA1C 6.2 (H) 06/14/2022     Lab Results   Component Value Date    CALCIUM 9.8 06/14/2022     Lab Results   Component Value Date    LDLCHOLESTEROL 75 06/14/2022       CURRENT MEDS W/ ASSOC DIAG           Start Date End Date     levothyroxine (SYNTHROID) 125 MCG tablet  06/13/22  --     Take 1 tablet by mouth Daily     Associated Diagnoses:  Hypothyroidism, unspecified type     olmesartan-hydroCHLOROthiazide (BENICAR HCT) 40-25 MG per tablet  06/13/22  --     Take 1 tablet by mouth daily     Associated Diagnoses:  Primary hypertension              Please note that this chart was generated using voice recognition Dragon dictation software. Although every effort was made to ensure the accuracy of this automated transcription, some errors in transcription may have occurred.   Electronically signed by Valdo Burgos MD on 9/9/2022 at 4:25 PM

## 2022-09-10 LAB
HPV SAMPLE: ABNORMAL
HPV, GENOTYPE 16: NOT DETECTED
HPV, GENOTYPE 18: DETECTED
HPV, HIGH RISK OTHER: DETECTED
HPV, INTERPRETATION: ABNORMAL
SPECIMEN DESCRIPTION: ABNORMAL

## 2022-09-14 LAB — CYTOLOGY REPORT: NORMAL

## 2022-09-16 ENCOUNTER — TELEPHONE (OUTPATIENT)
Dept: PRIMARY CARE CLINIC | Age: 52
End: 2022-09-16

## 2022-09-16 DIAGNOSIS — R87.610 ATYPICAL SQUAMOUS CELLS OF UNDETERMINED SIGNIFICANCE ON CYTOLOGIC SMEAR OF CERVIX (ASC-US): Primary | ICD-10-CM

## 2022-09-16 NOTE — TELEPHONE ENCOUNTER
----- Message from Marcy Clarke MD sent at 9/14/2022  9:01 PM EDT -----  Hello please notify the patient I obtained her PAP results, they were abnormal, I would want her to have follow-up with an OBGYN for further testing/possible Colposcopy - please let me know where should would like to be referred. Thank you.   Electronically signed by Marcy Clarke MD on 9/14/2022 at 9:01 PM

## 2022-09-29 ENCOUNTER — HOSPITAL ENCOUNTER (OUTPATIENT)
Dept: WOMENS IMAGING | Age: 52
Discharge: HOME | End: 2022-10-01
Payer: COMMERCIAL

## 2022-09-29 DIAGNOSIS — Z12.31 ENCOUNTER FOR SCREENING MAMMOGRAM FOR MALIGNANT NEOPLASM OF BREAST: ICD-10-CM

## 2022-09-29 PROCEDURE — 77063 BREAST TOMOSYNTHESIS BI: CPT

## 2022-10-08 PROBLEM — Z12.4 PAP SMEAR FOR CERVICAL CANCER SCREENING: Status: RESOLVED | Noted: 2022-09-08 | Resolved: 2022-10-08

## 2022-10-19 ENCOUNTER — HOSPITAL ENCOUNTER (OUTPATIENT)
Age: 52
Setting detail: SPECIMEN
Discharge: HOME | End: 2022-10-19
Payer: COMMERCIAL

## 2022-10-19 ENCOUNTER — PROCEDURE VISIT (OUTPATIENT)
Dept: OBGYN | Age: 52
End: 2022-10-19
Payer: COMMERCIAL

## 2022-10-19 VITALS
WEIGHT: 238 LBS | BODY MASS INDEX: 38.25 KG/M2 | DIASTOLIC BLOOD PRESSURE: 78 MMHG | HEIGHT: 66 IN | SYSTOLIC BLOOD PRESSURE: 136 MMHG

## 2022-10-19 DIAGNOSIS — R87.810 ASCUS WITH POSITIVE HIGH RISK HPV CERVICAL: Primary | ICD-10-CM

## 2022-10-19 DIAGNOSIS — R87.610 ASCUS WITH POSITIVE HIGH RISK HPV CERVICAL: ICD-10-CM

## 2022-10-19 DIAGNOSIS — R87.610 ASCUS WITH POSITIVE HIGH RISK HPV CERVICAL: Primary | ICD-10-CM

## 2022-10-19 DIAGNOSIS — R87.810 ASCUS WITH POSITIVE HIGH RISK HPV CERVICAL: ICD-10-CM

## 2022-10-19 PROCEDURE — 57456 ENDOCERV CURETTAGE W/SCOPE: CPT | Performed by: OBSTETRICS & GYNECOLOGY

## 2022-10-19 PROCEDURE — 88305 TISSUE EXAM BY PATHOLOGIST: CPT

## 2022-10-19 NOTE — PROGRESS NOTES
Colposcopy       ASCUS Pap smear with high risk HPV noted on Pap smear. Colposcopy of the vulva is negative. Vaginal colposcopy is also negative. Colposcopy of the cervix reveals no ectocervical lesions. The T-zone is seen within the endocervical canal and does appear normal.  There is no evidence of any abnormal vessels. An endocervical curettage was performed aggressively in the endocervical canal with Kevorkian curette. Assessment/plan: I see no evidence of any significant dysplasia at this time. Recommendation would be to have a repeat Pap if there is no worrisome findings on final pathology.     The patient states she is moving to [place] in a few weeks I did tell her we would make certain she had results prior to this time and would probably need a repeat Pap smear in 6 months      Approximate time 15 minutes

## 2022-10-21 LAB — SURGICAL PATHOLOGY REPORT: NORMAL

## 2022-11-03 ENCOUNTER — HOSPITAL ENCOUNTER (OUTPATIENT)
Dept: GENERAL RADIOLOGY | Age: 52
Discharge: HOME | End: 2022-11-05
Payer: COMMERCIAL

## 2022-11-03 ENCOUNTER — HOSPITAL ENCOUNTER (OUTPATIENT)
Age: 52
Discharge: HOME | End: 2022-11-05
Payer: COMMERCIAL

## 2022-11-03 DIAGNOSIS — R60.0 LEG EDEMA: ICD-10-CM

## 2022-11-03 DIAGNOSIS — M79.672 LEFT FOOT PAIN: ICD-10-CM

## 2022-11-03 PROCEDURE — 73630 X-RAY EXAM OF FOOT: CPT
